# Patient Record
Sex: FEMALE | Race: WHITE
[De-identification: names, ages, dates, MRNs, and addresses within clinical notes are randomized per-mention and may not be internally consistent; named-entity substitution may affect disease eponyms.]

---

## 2019-12-02 ENCOUNTER — HOSPITAL ENCOUNTER (EMERGENCY)
Dept: HOSPITAL 74 - FER | Age: 74
Discharge: HOME | End: 2019-12-02
Payer: COMMERCIAL

## 2019-12-02 VITALS — BODY MASS INDEX: 19.5 KG/M2

## 2019-12-02 VITALS — SYSTOLIC BLOOD PRESSURE: 179 MMHG | DIASTOLIC BLOOD PRESSURE: 102 MMHG | HEART RATE: 88 BPM | TEMPERATURE: 97.6 F

## 2019-12-02 DIAGNOSIS — E11.649: ICD-10-CM

## 2019-12-02 DIAGNOSIS — E11.65: ICD-10-CM
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
## 2019-12-02 DIAGNOSIS — M19.90: ICD-10-CM

## 2019-12-02 DIAGNOSIS — D56.9: ICD-10-CM

## 2019-12-02 DIAGNOSIS — M25.562: Primary | ICD-10-CM

## 2019-12-02 DIAGNOSIS — Z87.891: ICD-10-CM

## 2019-12-02 DIAGNOSIS — I10: ICD-10-CM

## 2019-12-02 DIAGNOSIS — Z79.01: ICD-10-CM

## 2019-12-02 DIAGNOSIS — I48.91: ICD-10-CM

## 2019-12-02 DIAGNOSIS — R01.1: ICD-10-CM
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
## 2019-12-02 NOTE — PDOC
Attending Attestation





- Resident


Resident Name: Stalin,Reeeleni ([facility])





- HPI


HPI: 





12/02/19 11:55


Pt presents to the ED complaining of ecchymosis of the L knee after a battery 

back hit her on the knee several days ago.  Ecchymosis has been constant in size

, but patient and family were concerned because it was persistent and she is on 

eliquis.  Patient is ambulatory.  Denies other injuries. 





- Physicial Exam


PE: 





12/02/19 11:59


+ large ecchymosis of the L knee.  Full ROM without hemarthrosis.  No sign of 

fracture. 





- Medical Decision Making





12/02/19 12:00


Pt presents to the ED with healing ecchymosis of the knee.  No expanding  

hematoma or hemarthrosis.  Will discharge home with instructions to return to 

the ED for worsening symptoms.

## 2019-12-02 NOTE — PDOC
History of Present Illness





- History of Present Illness


Initial Comments: 





12/02/19 10:51


HPI: 


75 y/o F with hx of Afib on eliquis, HTN, OA presenting for trauma to left knee 

2 days ago. She said a battery pack that is used for toys fell on her leg. The 

reason she is presenting is because her family was concerned of he ecchymosis  

since she is on eliquis. Pain is tolerable and she hasnt required taking any 

meds. Pain is 0/10 at rest and is only exacerbated by walking. She states it 

feels tender. No radiation. Bruise is stable. Patient continues to be 

ambulatory without assist








PMHx: as noted above


ROS: as noted


SHx: Denies tobacco use; no alcohol use; no rec drugs


Allergies: NKDA








ROS: 


GENERAL/CONSTITUTIONAL: No fever or chills. No weakness.


HEAD, EYES, EARS, NOSE AND THROAT: No change in vision. No ear pain or 

discharge. No sore throat.


CARDIOVASCULAR: No chest pain or shortness of breath


RESPIRATORY: No cough, wheezing, or hemoptysis.


GASTROINTESTINAL: No nausea, vomiting, diarrhea or constipation.


GENITOURINARY: No dysuria, frequency, or change in urination.


MUSCULOSKELETAL: +knee pain.


SKIN: No rash


NEUROLOGIC: No headache, vertigo, loss of consciousness, or change in strength/

sensation.


ENDOCRINE: No increased thirst. No abnormal weight change


HEMATOLOGIC/LYMPHATIC: No anemia, easy bleeding, or history of blood clots.


ALLERGIC/IMMUNOLOGIC: No hives or skin allergy.








PE: 


GENERAL: Awake, alert, and fully oriented, no acute distress


HEAD: No signs of trauma, normocephalic, atraumatic 


EYES: EOMI, sclera anicteric, conjunctiva clear


ENT: Auricles normal inspection, hearing grossly normal, nares patent, 

oropharynx clear without exudates. Moist mucosa


NECK: Normal ROM, no lymphadenopathy


LUNGS: No increased work of breathing, symmetrical chest rise, clear to 

auscultation bilaterally, no wheezes, crackles or rhonchi 


HEART: Regular rate and rhythm, normal S1 and S2, no murmurs, peripheral pulses 

2+ and equal bilaterally. 


ABDOMEN: Soft, nondistended, nontender, normoactive bowel sounds. No guarding, 

no rebound. No masses. No CVAT


EXTREMITIES: Normal inspection, Normal range of motion, no edema. No clubbing 

or cyanosis. 


NEUROLOGICAL: Cranial nerves II through XII grossly intact. Normal speech, 

normal gait, no focal sensorimotor deficits 


SKIN: left knee with ecchymosis and hematoma over medial aspect with ttp, no 

crepitus, no warmth, no open lesions or lacs, 5/5 str, sensation intact








<Omayra Gant - Last Filed: 12/02/19 10:51>





<Riya Brandt - Last Filed: 12/02/19 11:54>





- General


Chief Complaint: Pain


Stated Complaint: "A battery hit me on the leg"


Time Seen by Provider: 12/02/19 10:25





Past History





- Past Medical History


Anemia: Yes (thalassemia)


Asthma: No


Cancer: No (Murmur)


Cardiac Disorders: Yes (A fib)


CVA: No


COPD: No


CHF: No


Dementia: No


Diabetes: No (Hypoglycemia)


GI Disorders: No


 Disorders: No


HTN: Yes


Hypercholesterolemia: No


Liver Disease: No


Seizures: No


Thyroid Disease: No





- Surgical History


Abdominal Surgery: No


Appendectomy: No


Cardiac Surgery: No


Cholecystectomy: No


Lung Surgery: No


Neurologic Surgery: No


Orthopedic Surgery: No (Epidural Steroid Injection)





- Psycho Social/Smoking Cessation Hx


Smoking History: Former smoker


Have you smoked in the past 12 months: No


If you are a former smoker, when did you quit?: 40 years ago


Information on smoking cessation initiated: No


Hx Alcohol Use: No


Drug/Substance Use Hx: No


Substance Use Type: None


Hx Substance Use Treatment: No





<Omayra Gant - Last Filed: 12/02/19 10:51>





<Riya Brandt - Last Filed: 12/02/19 11:54>





- Past Medical History


Allergies/Adverse Reactions: 


 Allergies











Allergy/AdvReac Type Severity Reaction Status Date / Time


 


No Known Allergies Allergy   Verified 12/02/19 10:42











Home Medications: 


Ambulatory Orders





Aspirin [ASA -] 81 mg PO DAILY 12/31/13 


Esomeprazole Mag Trihydrate [Nexium] 40 mg PO DAILY 12/31/13 


Folic Acid - 1 mg PO DAILY 12/31/13 


Metoprolol Succinate [Toprol XL -] 100 mg PO DAILY 12/31/13 


Apixaban [Eliquis] 5 mg PO BID 12/02/19 











*Physical Exam





- Vital Signs


 Last Vital Signs











Temp Pulse Resp BP Pulse Ox


 


 97.6 F   88   16   179/102 H  100 


 


 12/02/19 10:14  12/02/19 10:14  12/02/19 10:14  12/02/19 10:14  12/02/19 10:14














<Omayra Gant - Last Filed: 12/02/19 10:51>





- Vital Signs


 Last Vital Signs











Temp Pulse Resp BP Pulse Ox


 


 97.6 F   88   16   179/102 H  100 


 


 12/02/19 10:14  12/02/19 10:14  12/02/19 10:14  12/02/19 10:14  12/02/19 10:14














<Riya Brandt - Last Filed: 12/02/19 11:54>





Medical Decision Making





- Medical Decision Making





12/02/19 10:54


75 y/o F with hx of Afib on eliquis, HTN, OA presenting for trauma to left knee 

2 days ago with resulting hematoma and ecchymosis. Pain is tolerable. PE with 

hematoma and overlying ecchymosis with ttp. 





-reassured patient that given stability of hematoma on eliquis, no additional 

workup or management required. continue ice for relief











<Omayra Gant - Last Filed: 12/02/19 10:51>





Discharge





- Discharge Information


Problems reviewed: Yes





<Omayra Gant - Last Filed: 12/02/19 10:51>





<Riya Brandt - Last Filed: 12/02/19 11:54>





- Discharge Information


Clinical Impression/Diagnosis: 


Knee pain


Qualifiers:


 Chronicity: acute Laterality: left Qualified Code(s): M25.562 - Pain in left 

knee





Condition: Stable


Disposition: HOME





- Patient Discharge Instructions


Patient Printed Discharge Instructions:  DI for Knee Pain


Additional Instructions: 


Additional Instructions: 


Please return to the emergency department with any new or worsening symptoms or 

concerns. 





Please follow up with your primary care physician as needed





You may expect the bruise on your knee to change colors (green/yellow) prior to 

completely healing. The bruise may take several days and even weeks to fully 

resolve. You may continue to use ice for swelling and pain relief. You may take 

tylenol for pain control

## 2021-01-14 ENCOUNTER — HOSPITAL ENCOUNTER (EMERGENCY)
Dept: HOSPITAL 74 - JVIRT | Age: 76
Discharge: HOME | End: 2021-01-14
Payer: COMMERCIAL

## 2021-01-14 DIAGNOSIS — Z20.822: Primary | ICD-10-CM

## 2021-01-14 PROCEDURE — U0003 INFECTIOUS AGENT DETECTION BY NUCLEIC ACID (DNA OR RNA); SEVERE ACUTE RESPIRATORY SYNDROME CORONAVIRUS 2 (SARS-COV-2) (CORONAVIRUS DISEASE [COVID-19]), AMPLIFIED PROBE TECHNIQUE, MAKING USE OF HIGH THROUGHPUT TECHNOLOGIES AS DESCRIBED BY CMS-2020-01-R: HCPCS

## 2021-01-14 PROCEDURE — C9803 HOPD COVID-19 SPEC COLLECT: HCPCS

## 2022-02-26 ENCOUNTER — HOSPITAL ENCOUNTER (INPATIENT)
Dept: HOSPITAL 74 - FER | Age: 77
LOS: 4 days | Discharge: NURSING, REHAB OR LONG-TERM CARE | DRG: 535 | End: 2022-03-02
Attending: NURSE PRACTITIONER | Admitting: INTERNAL MEDICINE
Payer: COMMERCIAL

## 2022-02-26 VITALS — BODY MASS INDEX: 21.5 KG/M2

## 2022-02-26 DIAGNOSIS — S32.110A: ICD-10-CM

## 2022-02-26 DIAGNOSIS — S32.591A: Primary | ICD-10-CM

## 2022-02-26 DIAGNOSIS — I10: ICD-10-CM

## 2022-02-26 DIAGNOSIS — M25.451: ICD-10-CM

## 2022-02-26 DIAGNOSIS — S32.592A: ICD-10-CM
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
## 2022-02-26 DIAGNOSIS — I48.91: ICD-10-CM

## 2022-02-26 DIAGNOSIS — M81.0: ICD-10-CM

## 2022-02-26 DIAGNOSIS — S32.512A: ICD-10-CM

## 2022-02-26 DIAGNOSIS — S70.01XA: ICD-10-CM

## 2022-02-26 DIAGNOSIS — S32.411A: ICD-10-CM

## 2022-02-26 DIAGNOSIS — D56.9: ICD-10-CM

## 2022-02-26 DIAGNOSIS — D72.829: ICD-10-CM

## 2022-02-26 LAB
ALBUMIN SERPL-MCNC: 4 G/DL (ref 3.4–5)
ALP SERPL-CCNC: 77 U/L (ref 45–117)
ALT SERPL-CCNC: 22 U/L (ref 13–61)
ANION GAP SERPL CALC-SCNC: 11 MMOL/L (ref 8–16)
ANISOCYTOSIS BLD QL: (no result)
APTT BLD: 28.5 SECONDS (ref 25.2–36.5)
AST SERPL-CCNC: 46 U/L (ref 15–37)
BASOPHILS # BLD: 0.2 % (ref 0–2)
BILIRUB SERPL-MCNC: 2.4 MG/DL (ref 0.2–1)
BUN SERPL-MCNC: 21 MG/DL (ref 7–18)
CALCIUM SERPL-MCNC: 9.2 MG/DL (ref 8.5–10)
CHLORIDE SERPL-SCNC: 102 MMOL/L (ref 98–107)
CO2 SERPL-SCNC: 21 MMOL/L (ref 21–32)
CREAT SERPL-MCNC: 0.7 MG/DL (ref 0.55–1.3)
DACRYOCYTES BLD QL SMEAR: (no result)
DEPRECATED RDW RBC AUTO: 15.7 % (ref 11.6–15.6)
EOSINOPHIL # BLD: 0.4 % (ref 0–4.5)
GLUCOSE SERPL-MCNC: 141 MG/DL (ref 74–106)
HCT VFR BLD CALC: 34.5 % (ref 32.4–45.2)
HGB BLD-MCNC: 10.9 GM/DL (ref 10.7–15.3)
INR BLD: 1.45 (ref 0.83–1.09)
LIPASE SERPL-CCNC: 124 U/L (ref 73–393)
LYMPHOCYTES # BLD: 9.5 % (ref 8–40)
MACROCYTES BLD QL: 0
MCH RBC QN AUTO: 20.5 PG (ref 25.7–33.7)
MCHC RBC AUTO-ENTMCNC: 31.6 G/DL (ref 32–36)
MCV RBC: 64.7 FL (ref 80–96)
MONOCYTES # BLD AUTO: 4.2 % (ref 3.8–10.2)
NEUTROPHILS # BLD: 85.7 % (ref 42.8–82.8)
OVALOCYTES BLD QL SMEAR: (no result)
PLATELET # BLD AUTO: 140 10^3/UL (ref 134–434)
PMV BLD: 11.4 FL (ref 7.5–11.1)
PROT SERPL-MCNC: 7.2 G/DL (ref 6.4–8.2)
PT PNL PPP: 16.7 SEC (ref 9.7–13)
RBC # BLD AUTO: 5.33 M/MM3 (ref 3.6–5.2)
SODIUM SERPL-SCNC: 134 MMOL/L (ref 136–145)
TARGETS BLD QL SMEAR: (no result)
WBC # BLD AUTO: 13.8 K/MM3 (ref 4–10)

## 2022-02-26 PROCEDURE — U0003 INFECTIOUS AGENT DETECTION BY NUCLEIC ACID (DNA OR RNA); SEVERE ACUTE RESPIRATORY SYNDROME CORONAVIRUS 2 (SARS-COV-2) (CORONAVIRUS DISEASE [COVID-19]), AMPLIFIED PROBE TECHNIQUE, MAKING USE OF HIGH THROUGHPUT TECHNOLOGIES AS DESCRIBED BY CMS-2020-01-R: HCPCS

## 2022-02-26 PROCEDURE — U0005 INFEC AGEN DETEC AMPLI PROBE: HCPCS

## 2022-02-26 PROCEDURE — C9803 HOPD COVID-19 SPEC COLLECT: HCPCS

## 2022-02-27 LAB
ANION GAP SERPL CALC-SCNC: 8 MMOL/L (ref 8–16)
APTT BLD: 27.8 SECONDS (ref 25.2–36.5)
BILIRUB SERPL-MCNC: 2.6 MG/DL (ref 0.2–1)
BUN SERPL-MCNC: 18 MG/DL (ref 7–18)
CALCIUM SERPL-MCNC: 8.8 MG/DL (ref 8.5–10)
CHLORIDE SERPL-SCNC: 104 MMOL/L (ref 98–107)
CO2 SERPL-SCNC: 23 MMOL/L (ref 21–32)
CREAT SERPL-MCNC: 0.6 MG/DL (ref 0.55–1.3)
DEPRECATED RDW RBC AUTO: 15.4 % (ref 11.6–15.6)
DEPRECATED RDW RBC AUTO: 16 % (ref 11.6–15.6)
EPITH CASTS URNS QL MICRO: (no result) /HPF
GLUCOSE SERPL-MCNC: 97 MG/DL (ref 74–106)
HCT VFR BLD CALC: 32.2 % (ref 32.4–45.2)
HCT VFR BLD CALC: 32.4 % (ref 32.4–45.2)
HGB BLD-MCNC: 10 GM/DL (ref 10.7–15.3)
HGB BLD-MCNC: 10.2 GM/DL (ref 10.7–15.3)
INR BLD: 1.35 (ref 0.83–1.09)
MAGNESIUM SERPL-MCNC: 2 MG/DL (ref 1.8–2.4)
MCH RBC QN AUTO: 20 PG (ref 25.7–33.7)
MCH RBC QN AUTO: 20.7 PG (ref 25.7–33.7)
MCHC RBC AUTO-ENTMCNC: 30.8 G/DL (ref 32–36)
MCHC RBC AUTO-ENTMCNC: 31.6 G/DL (ref 32–36)
MCV RBC: 64.8 FL (ref 80–96)
MCV RBC: 65.4 FL (ref 80–96)
PHOSPHATE SERPL-MCNC: 3.9 MG/DL (ref 2.5–4.9)
PLATELET # BLD AUTO: 132 10^3/UL (ref 134–434)
PLATELET # BLD AUTO: 137 10^3/UL (ref 134–434)
PMV BLD: 10.9 FL (ref 7.5–11.1)
PMV BLD: 11.6 FL (ref 7.5–11.1)
PT PNL PPP: 15.6 SEC (ref 9.7–13)
RBC # BLD AUTO: 4.93 M/MM3 (ref 3.6–5.2)
RBC # BLD AUTO: 5.01 M/MM3 (ref 3.6–5.2)
SODIUM SERPL-SCNC: 135 MMOL/L (ref 136–145)
WBC # BLD AUTO: 12.4 K/MM3 (ref 4–10)
WBC # BLD AUTO: 12.4 K/MM3 (ref 4–10)

## 2022-02-27 RX ADMIN — PANTOPRAZOLE SODIUM SCH MG: 40 TABLET, DELAYED RELEASE ORAL at 09:28

## 2022-02-27 RX ADMIN — FOLIC ACID SCH MG: 1 TABLET ORAL at 09:28

## 2022-02-27 RX ADMIN — APIXABAN SCH MG: 5 TABLET, FILM COATED ORAL at 21:07

## 2022-02-28 LAB
ALBUMIN SERPL-MCNC: 3.6 G/DL (ref 3.4–5)
ALP SERPL-CCNC: 67 U/L (ref 45–117)
ALT SERPL-CCNC: 17 U/L (ref 13–61)
ANION GAP SERPL CALC-SCNC: 10 MMOL/L (ref 8–16)
AST SERPL-CCNC: 30 U/L (ref 15–37)
BASOPHILS # BLD: 0.2 % (ref 0–2)
BILIRUB SERPL-MCNC: 2.6 MG/DL (ref 0.2–1)
BUN SERPL-MCNC: 16 MG/DL (ref 7–18)
CALCIUM SERPL-MCNC: 8.6 MG/DL (ref 8.5–10)
CHLORIDE SERPL-SCNC: 99 MMOL/L (ref 98–107)
CO2 SERPL-SCNC: 24 MMOL/L (ref 21–32)
CREAT SERPL-MCNC: 0.8 MG/DL (ref 0.55–1.3)
DEPRECATED RDW RBC AUTO: 15.7 % (ref 11.6–15.6)
EOSINOPHIL # BLD: 2 % (ref 0–4.5)
GLUCOSE SERPL-MCNC: 143 MG/DL (ref 74–106)
HCT VFR BLD CALC: 32.5 % (ref 32.4–45.2)
HGB BLD-MCNC: 10.3 GM/DL (ref 10.7–15.3)
LYMPHOCYTES # BLD: 13 % (ref 8–40)
MAGNESIUM SERPL-MCNC: 1.9 MG/DL (ref 1.8–2.4)
MCH RBC QN AUTO: 20.5 PG (ref 25.7–33.7)
MCHC RBC AUTO-ENTMCNC: 31.7 G/DL (ref 32–36)
MCV RBC: 64.9 FL (ref 80–96)
MONOCYTES # BLD AUTO: 4.6 % (ref 3.8–10.2)
NEUTROPHILS # BLD: 80.2 % (ref 42.8–82.8)
PLATELET # BLD AUTO: 103 10^3/UL (ref 134–434)
PMV BLD: 9.9 FL (ref 7.5–11.1)
PROT SERPL-MCNC: 6.8 G/DL (ref 6.4–8.2)
RBC # BLD AUTO: 5.01 M/MM3 (ref 3.6–5.2)
SODIUM SERPL-SCNC: 133 MMOL/L (ref 136–145)
WBC # BLD AUTO: 11.8 K/MM3 (ref 4–10)

## 2022-02-28 RX ADMIN — APIXABAN SCH MG: 5 TABLET, FILM COATED ORAL at 09:30

## 2022-02-28 RX ADMIN — APIXABAN SCH MG: 5 TABLET, FILM COATED ORAL at 21:25

## 2022-02-28 RX ADMIN — FOLIC ACID SCH MG: 1 TABLET ORAL at 09:30

## 2022-02-28 RX ADMIN — PANTOPRAZOLE SODIUM SCH MG: 40 TABLET, DELAYED RELEASE ORAL at 09:30

## 2022-03-01 LAB
ALBUMIN SERPL-MCNC: 3.2 G/DL (ref 3.4–5)
ALP SERPL-CCNC: 59 U/L (ref 45–117)
ALT SERPL-CCNC: 13 U/L (ref 13–61)
ANION GAP SERPL CALC-SCNC: 8 MMOL/L (ref 8–16)
AST SERPL-CCNC: 23 U/L (ref 15–37)
BASOPHILS # BLD: 0.4 % (ref 0–2)
BILIRUB SERPL-MCNC: 3.1 MG/DL (ref 0.2–1)
BUN SERPL-MCNC: 20 MG/DL (ref 7–18)
CALCIUM SERPL-MCNC: 8.4 MG/DL (ref 8.5–10)
CHLORIDE SERPL-SCNC: 101 MMOL/L (ref 98–107)
CO2 SERPL-SCNC: 26 MMOL/L (ref 21–32)
CREAT SERPL-MCNC: 0.8 MG/DL (ref 0.55–1.3)
DEPRECATED RDW RBC AUTO: 15.6 % (ref 11.6–15.6)
EOSINOPHIL # BLD: 4.2 % (ref 0–4.5)
GLUCOSE SERPL-MCNC: 91 MG/DL (ref 74–106)
HCT VFR BLD CALC: 31.9 % (ref 32.4–45.2)
HGB BLD-MCNC: 9.9 GM/DL (ref 10.7–15.3)
LYMPHOCYTES # BLD: 10.9 % (ref 8–40)
MAGNESIUM SERPL-MCNC: 2 MG/DL (ref 1.8–2.4)
MCH RBC QN AUTO: 19.9 PG (ref 25.7–33.7)
MCHC RBC AUTO-ENTMCNC: 30.9 G/DL (ref 32–36)
MCV RBC: 64.4 FL (ref 80–96)
MONOCYTES # BLD AUTO: 5.9 % (ref 3.8–10.2)
NEUTROPHILS # BLD: 78.6 % (ref 42.8–82.8)
PLATELET # BLD AUTO: 101 10^3/UL (ref 134–434)
PMV BLD: 10.2 FL (ref 7.5–11.1)
PROT SERPL-MCNC: 6.3 G/DL (ref 6.4–8.2)
RBC # BLD AUTO: 4.95 M/MM3 (ref 3.6–5.2)
SODIUM SERPL-SCNC: 135 MMOL/L (ref 136–145)
WBC # BLD AUTO: 11.3 K/MM3 (ref 4–10)

## 2022-03-01 RX ADMIN — ACETAMINOPHEN SCH MG: 325 TABLET ORAL at 20:19

## 2022-03-01 RX ADMIN — POLYETHYLENE GLYCOL 3350 SCH GM: 17 POWDER, FOR SOLUTION ORAL at 21:43

## 2022-03-01 RX ADMIN — APIXABAN SCH MG: 5 TABLET, FILM COATED ORAL at 10:08

## 2022-03-01 RX ADMIN — ACETAMINOPHEN SCH MG: 325 TABLET ORAL at 14:45

## 2022-03-01 RX ADMIN — FOLIC ACID SCH MG: 1 TABLET ORAL at 10:08

## 2022-03-01 RX ADMIN — POLYETHYLENE GLYCOL 3350 SCH GM: 17 POWDER, FOR SOLUTION ORAL at 10:08

## 2022-03-01 RX ADMIN — PANTOPRAZOLE SODIUM SCH MG: 40 TABLET, DELAYED RELEASE ORAL at 10:08

## 2022-03-01 RX ADMIN — APIXABAN SCH MG: 5 TABLET, FILM COATED ORAL at 21:43

## 2022-03-02 VITALS — SYSTOLIC BLOOD PRESSURE: 149 MMHG | HEART RATE: 92 BPM | DIASTOLIC BLOOD PRESSURE: 69 MMHG | TEMPERATURE: 98.7 F

## 2022-03-02 RX ADMIN — ACETAMINOPHEN SCH MG: 325 TABLET ORAL at 01:57

## 2022-03-02 RX ADMIN — FOLIC ACID SCH MG: 1 TABLET ORAL at 10:09

## 2022-03-02 RX ADMIN — APIXABAN SCH MG: 5 TABLET, FILM COATED ORAL at 10:09

## 2022-03-02 RX ADMIN — ACETAMINOPHEN SCH MG: 325 TABLET ORAL at 10:13

## 2022-03-02 RX ADMIN — ACETAMINOPHEN SCH MG: 325 TABLET ORAL at 13:54

## 2022-03-02 RX ADMIN — POLYETHYLENE GLYCOL 3350 SCH: 17 POWDER, FOR SOLUTION ORAL at 10:08

## 2022-03-02 RX ADMIN — PANTOPRAZOLE SODIUM SCH MG: 40 TABLET, DELAYED RELEASE ORAL at 10:09
